# Patient Record
Sex: MALE | Race: WHITE | NOT HISPANIC OR LATINO | URBAN - METROPOLITAN AREA
[De-identification: names, ages, dates, MRNs, and addresses within clinical notes are randomized per-mention and may not be internally consistent; named-entity substitution may affect disease eponyms.]

---

## 2024-10-02 ENCOUNTER — APPOINTMENT (OUTPATIENT)
Dept: OTOLARYNGOLOGY | Facility: CLINIC | Age: 39
End: 2024-10-02
Payer: COMMERCIAL

## 2024-10-02 VITALS
SYSTOLIC BLOOD PRESSURE: 139 MMHG | HEIGHT: 69 IN | DIASTOLIC BLOOD PRESSURE: 95 MMHG | TEMPERATURE: 97.7 F | BODY MASS INDEX: 26.45 KG/M2 | WEIGHT: 178.6 LBS

## 2024-10-02 DIAGNOSIS — J30.9 CHRONIC ALLERGIC RHINITIS: ICD-10-CM

## 2024-10-02 DIAGNOSIS — J34.89 NASAL MUCOSA DRY: ICD-10-CM

## 2024-10-02 DIAGNOSIS — J34.89 NASAL CRUSTING: Primary | ICD-10-CM

## 2024-10-02 PROCEDURE — 99203 OFFICE O/P NEW LOW 30 MIN: CPT | Performed by: OTOLARYNGOLOGY

## 2024-10-02 PROCEDURE — 3008F BODY MASS INDEX DOCD: CPT | Performed by: OTOLARYNGOLOGY

## 2024-10-02 PROCEDURE — 1036F TOBACCO NON-USER: CPT | Performed by: OTOLARYNGOLOGY

## 2024-10-02 RX ORDER — SERTRALINE HYDROCHLORIDE 100 MG/1
100 TABLET, FILM COATED ORAL
COMMUNITY
Start: 2024-02-20

## 2024-10-02 RX ORDER — LEVOCETIRIZINE DIHYDROCHLORIDE 5 MG/1
TABLET, FILM COATED ORAL EVERY EVENING
COMMUNITY

## 2024-10-02 NOTE — PROGRESS NOTES
"Impression:  1. Nasal crusting        2. Nasal mucosa dry        3. Chronic allergic rhinitis             RECOMMENDATIONS/PLAN :  I reassured the patient there is no evidence of any active inflammation or infection or any bleeding along the inside of his nostrils however the mucosa is somewhat dry.  I want him to use saline spray in the nose to keep everything moist and then apply bacitracin just inside his nostril on each side twice daily for the next 10 days.  I want him to avoid plucking nose hairs as this can create inflammation as well.  He will otherwise follow-up with his family physician as needed      **This electronic medical record note was created with the use of voice recognition software.  Despite proofreading, typographical or grammatical errors may be present that could affect meaning of content **    Subjective   Patient ID:     Jean Escoto \"Sowmya" is a 39 y.o. male who presents to the office today complaining of intermittent crusting inside both nostrils.  This seems to come and go.  He has not been using any antibiotic ointment in the nose.  He has been treating his allergies with oral antihistamines.  He denies any infectious drainage fever or chills.  He has plucked some nose hairs in the past and this could have caused some inflammation.  No pus draining from the nose.    ROS:  A detailed 12 system review of systems is noted on the intake form has been reviewed with the patient with details noted in the HPI and scanned into the patient's medical record.    Objective     History reviewed. No pertinent past medical history.     History reviewed. No pertinent surgical history.     Allergies   Allergen Reactions    Tramadol Other     'jittery'          Current Outpatient Medications:     levocetirizine (Xyzal) 5 mg tablet, Take by mouth once daily in the evening., Disp: , Rfl:     sertraline (Zoloft) 100 mg tablet, Take 1 tablet (100 mg) by mouth once daily., Disp: , Rfl:      Tobacco Use: Low " "Risk  (10/2/2024)    Patient History     Smoking Tobacco Use: Never     Smokeless Tobacco Use: Never     Passive Exposure: Not on file        Alcohol Use: Alcohol Misuse (9/12/2023)    [facility]    AUDIT-C     Frequency of Alcohol Consumption: 4 or more times a week     Average Number of Drinks: 3 or 4     Frequency of Binge Drinking: Monthly        Social History     Substance and Sexual Activity   Drug Use Not on file        Physical Exam:  Visit Vitals  BP (!) 139/95   Temp 36.5 °C (97.7 °F) (Temporal)   Ht 1.753 m (5' 9\")   Wt 81 kg (178 lb 9.6 oz)   BMI 26.37 kg/m²   Smoking Status Never   BSA 1.99 m²      General: Patient is alert, oriented, cooperative in no apparent distress.  Head: Normocephalic, atraumatic.  Eyes: PERRL, EOMI, Conjunctiva is clear. No nystagmus.  Ears: Right Ear-- Pinna is normal.  External auditory canal is patent. Tympanic membrane is [intact, translucent and has good mobility with my pneumatic otoscope. No effusion].  Mastoid is nontender.  Left ear-- Pinna is normal.  External auditory canal is patent. Tympanic membrane is [intact, translucent and has good mobility with my pneumatic otoscope.  No effusion].  Mastoid is nontender.  Nose: Septum is relatively straight.  The mucosa is somewhat dry.  Just inside each nostril the skin is slightly irritated and dry but no sign of erythema or infection and no pus present..  No septal perforation or lesions. No septal hematoma/ seroma.  No signs of bleeding.  Inferior turbinates are mildly swollen.   No evidence of intranasal polyps.  No infectious drainage.  Throat:  Floor of mouth is clear, no masses.  Tongue appears normal, no lesions or masses. Gums, gingiva, buccal mucosa appear pink and moist, no lesions. Teeth are in good repair.  No obvious dental infections.  Peritonsillar regions appear symmetric without swelling.  Hard and soft palate appear normal, no obvious cleft. Uvula is " midline.  Oropharynx: No lesions. Retropharyngeal wall is flat.  No active postnasal drip.  Neck: Supple,  no lymphadenopathy.  No masses.  Salivary Glands: Symmetric bilaterally.  No palpable masses.  No evidence of acute infection or salivary stones  Neurologic: Cranial Nerves 2-12 are grossly intact without focal deficits. Cerebellar function testing is normal.     Results:   []    Procedure:   []    Kirill Sauer, DO